# Patient Record
Sex: FEMALE | URBAN - METROPOLITAN AREA
[De-identification: names, ages, dates, MRNs, and addresses within clinical notes are randomized per-mention and may not be internally consistent; named-entity substitution may affect disease eponyms.]

---

## 2024-10-08 ENCOUNTER — APPOINTMENT (RX ONLY)
Dept: URBAN - METROPOLITAN AREA CLINIC 154 | Facility: CLINIC | Age: 59
Setting detail: DERMATOLOGY
End: 2024-10-08

## 2024-10-08 DIAGNOSIS — D485 NEOPLASM OF UNCERTAIN BEHAVIOR OF SKIN: ICD-10-CM | Status: STABLE

## 2024-10-08 DIAGNOSIS — L259 CONTACT DERMATITIS AND OTHER ECZEMA, UNSPECIFIED CAUSE: ICD-10-CM | Status: INADEQUATELY CONTROLLED

## 2024-10-08 PROBLEM — L30.8 OTHER SPECIFIED DERMATITIS: Status: ACTIVE | Noted: 2024-10-08

## 2024-10-08 PROBLEM — D48.5 NEOPLASM OF UNCERTAIN BEHAVIOR OF SKIN: Status: ACTIVE | Noted: 2024-10-08

## 2024-10-08 PROCEDURE — 99204 OFFICE O/P NEW MOD 45 MIN: CPT

## 2024-10-08 PROCEDURE — ? PRESCRIPTION

## 2024-10-08 PROCEDURE — ? COUNSELING

## 2024-10-08 PROCEDURE — ? TREATMENT REGIMEN

## 2024-10-08 RX ORDER — BETAMETHASONE DIPROPIONATE 0.5 MG/G
SMALL AMOUNT OINTMENT TOPICAL BID
Qty: 45 | Refills: 1 | Status: ERX | COMMUNITY
Start: 2024-10-08

## 2024-10-08 RX ADMIN — BETAMETHASONE DIPROPIONATE SMALL AMOUNT: 0.5 OINTMENT TOPICAL at 00:00

## 2024-10-08 ASSESSMENT — LOCATION ZONE DERM
LOCATION ZONE: LEG
LOCATION ZONE: NOSE
LOCATION ZONE: FINGER

## 2024-10-08 ASSESSMENT — LOCATION SIMPLE DESCRIPTION DERM
LOCATION SIMPLE: RIGHT POSTERIOR THIGH
LOCATION SIMPLE: RIGHT NOSE
LOCATION SIMPLE: LEFT THUMB
LOCATION SIMPLE: RIGHT THUMB
LOCATION SIMPLE: LEFT POSTERIOR THIGH

## 2024-10-08 ASSESSMENT — LOCATION DETAILED DESCRIPTION DERM
LOCATION DETAILED: RIGHT DISTAL POSTERIOR THIGH
LOCATION DETAILED: LEFT DISTAL ULNAR THUMB
LOCATION DETAILED: RIGHT NARIS
LOCATION DETAILED: LEFT DISTAL POSTERIOR THIGH
LOCATION DETAILED: RIGHT DISTAL ULNAR THUMB

## 2024-10-08 NOTE — PROCEDURE: TREATMENT REGIMEN
Detail Level: Simple
Plan: Advised patient to follow-up with ENT as lesion is in the nares and unable to clearly be visualized by me due to location,  offered to attempt biopsy but patient preferred to follow with ENT first
Otc Regimen: Unscented gentle hydrating cleansers and moisturizers
Plan: Discussed lukewarm to cool water when bathing. Cleanser “dirty” areas daily but rash areas 1-2 times per week. Use moisturizers that are unscented and natural creams and ointments not lotions. Advised to protect hands when using chemical and submerging in water etc, and moisturize the same as legs. Humidifier in room at night time\\n\\nNext visit if no improvement option for biopsy discussed in addition to option for referral to allergist. Patient would like to wait until next appt to address further referral or testing
Initiate Treatment: Betamethasone ointment Apply to affected area of legs twice daily x2 weeks then 3 days per week prn active rash and itch then apply moisturizer

## 2024-10-08 NOTE — HPI: RASH
What Type Of Note Output Would You Prefer (Optional)?: Standard Output
Is The Patient Presenting As Previously Scheduled?: Yes
How Severe Is Your Rash?: mild
Is This A New Presentation, Or A Follow-Up?: Rash
Additional History: Patient used clotrimazole x15 days and triamcinole cream x1 month and 2 others x15 days she is unable to remember names of (prescribed by her cousin in [place]) without improvement, very itchy and bright red.